# Patient Record
Sex: MALE | Race: WHITE | Employment: FULL TIME | URBAN - METROPOLITAN AREA
[De-identification: names, ages, dates, MRNs, and addresses within clinical notes are randomized per-mention and may not be internally consistent; named-entity substitution may affect disease eponyms.]

---

## 2019-10-08 DIAGNOSIS — N46.01 AZOOSPERMIA: ICD-10-CM

## 2019-10-08 DIAGNOSIS — Z30.2 STERILIZATION: Primary | ICD-10-CM

## 2019-10-09 DIAGNOSIS — Z30.2 STERILIZATION: Primary | ICD-10-CM

## 2019-10-09 DIAGNOSIS — N46.01 AZOOSPERMIA: ICD-10-CM

## 2019-10-09 LAB
ABSTINENCE DAYS: 3 DAYS (ref 2–7)
AGGLUTINATION: NO YES/NO
ANALYSIS TEMP - CENTIGRADE: 22 CENTIGRADE
CELL FRAGMENTS: ABNORMAL %
COLLECTION METHOD: ABNORMAL
COLLECTION SITE: ABNORMAL
CONSENT TO RELEASE TO PARTNER: YES
IMMATURE SPERM: ABNORMAL %
IMMOTILE: 0 %
LAB RECEIPT TIME: ABNORMAL
LIQUEFIED: YES YES/NO
NON-PROGRESSIVE MOTILITY: 0 %
PROGRESSIVE MOTILITY: 0 % (ref 32–?)
ROUND CELLS: 0 MILLION/ML (ref ?–2)
SPECIMEN CONCENTRATION: 0 MILLION/ML (ref 15–?)
SPECIMEN PH: 7.6 PH (ref 7.2–?)
SPECIMEN TYPE: ABNORMAL
SPECIMEN VOL UR: 2.5 ML (ref 1.5–?)
TIME OF ANALYSIS: ABNORMAL
TOTAL NUMBER: 0 MILLION (ref 39–?)
TOTAL PROGRESSIVE MOTILE: 0 MILLION (ref 15.6–?)
VISCOUS: NO YES/NO
WBC SPECIMEN: ABNORMAL %

## 2019-10-09 PROCEDURE — 89260 SPERM ISOLATION SIMPLE: CPT

## 2020-01-17 NOTE — TELEPHONE ENCOUNTER
MEDICAL RECORDS REQUEST   [facility]  Urology Clinic  [address] [place] [address]  PHONE: [phone]  Fax: [phone]        FUTURE VISIT INFORMATION                                                   Jamel Bacon : 1980 scheduled for future visit at [facility]    APPOINTMENT INFORMATION:    Date: 20 9:40AM     Provider:  Davi Mendez MD    Reason for Visit/Diagnosis: INFERTILITY     REFERRAL INFORMATION:    Referring provider: Self    Specialty: N/A    Clinic contact number:  N/A    RECORDS REQUESTED FOR VISIT                                                     NOTES  STATUS/DETAILS   OFFICE NOTE from referring provider  no   OFFICE NOTE from other specialist  no   DISCHARGE SUMMARY from hospital  no   DISCHARGE REPORT from the ER  no   OPERATIVE REPORT  no   MEDICATION LIST  no   INFERTILITY     ALBUMIN  no   FSH  no   LAST UROLOGY/OB GYN VISIT NOTE  no   LH  no   SEMEN ANALYSIS (LAST 2)  yes   SHBG  no   T  no     PRE-VISIT CHECKLIST      Record collection complete Yes- SA in epic   Appointment appropriately scheduled           (right time/right provider) Yes   MyChart activation If no, please explain: In process    Questionnaire complete If no, please explain: In process      Completed by: Shabnam Means

## 2020-02-04 ENCOUNTER — PRE VISIT (OUTPATIENT)
Dept: UROLOGY | Facility: CLINIC | Age: 40
End: 2020-02-04

## 2020-02-04 NOTE — TELEPHONE ENCOUNTER
Reason for Visit: Consult to discuss fertility    Orders/Procedures/Records: SA in system     Contact Patient: n/a    Rooming Requirements: normal      Roselyn Roach LPN  02/04/20  3:19 PM

## 2020-02-24 ASSESSMENT — ENCOUNTER SYMPTOMS
PANIC: 0
DYSURIA: 0
SWOLLEN GLANDS: 0
DEPRESSION: 0
HEMATURIA: 0
DIFFICULTY URINATING: 0
NERVOUS/ANXIOUS: 1
INSOMNIA: 1
DECREASED CONCENTRATION: 1
BRUISES/BLEEDS EASILY: 0
FLANK PAIN: 0

## 2020-02-28 ENCOUNTER — OFFICE VISIT (OUTPATIENT)
Dept: UROLOGY | Facility: CLINIC | Age: 40
End: 2020-02-28
Payer: COMMERCIAL

## 2020-02-28 ENCOUNTER — PRE VISIT (OUTPATIENT)
Dept: UROLOGY | Facility: CLINIC | Age: 40
End: 2020-02-28

## 2020-02-28 VITALS
HEIGHT: 67 IN | BODY MASS INDEX: 31.08 KG/M2 | WEIGHT: 198 LBS | SYSTOLIC BLOOD PRESSURE: 139 MMHG | DIASTOLIC BLOOD PRESSURE: 75 MMHG | HEART RATE: 58 BPM

## 2020-02-28 DIAGNOSIS — N46.01 AZOOSPERMIA: Primary | ICD-10-CM

## 2020-02-28 ASSESSMENT — PAIN SCALES - GENERAL: PAINLEVEL: NO PAIN (0)

## 2020-02-28 ASSESSMENT — MIFFLIN-ST. JEOR: SCORE: 1781.75

## 2020-02-28 NOTE — PROGRESS NOTES
It was my pleasure to see Mr. Jamel Bacon, a 39 year old male here in consultation today for fertility evaluation.  His spouse is Radha Bacon age 31 (1/9/89).    This couple has been attempting to conceive for the last 1.5 yrs.  Pregnancies with other partners: he has 3 previous children, Radha has no pregnancy with other partners.  They have tried timed intercourse. They have not tried IUI or IVF.    Female factors suspected: None known.  Cycles are regular. Normal pelvic US.    Male factor-   -history of vasectomy 2012, 3 children ( twins) before the vasectomy.  -history of vasovasostomy 8/2018 Dr. Mckeon (EZ vasectomy).  They got pregnant around Oct or Nov 2018, and no further pregnancies after that.  - he had 2 semen analyses that showed azoospermia, about 10/2019 and 12/2019.    PAST MEDICAL HISTORY:    No chronic medical problems     PAST SURG HISTORY  Vasectomy 2012  Vasovasostomy 2018    Medications as of 2/28/2020:  No prescription medications     ALLERGY:   No Known Allergies    SOCIAL HISTORY:  . Occupation: law firm, .  No alcohol abuse, no tobacco use.   Social History     Tobacco Use     Smoking status: Former Smoker     Smokeless tobacco: Never Used   Substance Use Topics     Alcohol use: None     Drug use: None       FAMILY HISTORY: No inherited disorders.      REVIEW OF SYSTEMS:  Significant for noting sometimes less libido and erection duration.    Denies erectile dysfunction, ejaculatory problems, testicular pain. No vision or smell deficits, no chronic sinus or respiratory infections. No recent febrile illness, weight loss. No heat or cold intolerance, gynecomastia, or other endocrine complaints.    Otherwise, no constitutional, eye, ENT, heart, lung, GI , musculoskeletal, skin, neurologic, psychiatric, or hematologic complaints.    GONADOTOXIN EXPOSURE: They do have a hot tub- uses 2x/week. Otherwise negative for marijuana, heat, chemicals, pesticides, heavy metals, steroids,  "chemotherapy or radiation.    GENERAL PHYSICAL EXAM  /75   Pulse 58   Ht 1.702 m (5' 7\")   Wt 89.8 kg (198 lb)   BMI 31.01 kg/m     Constitutional: No acute distress. Well nourished.   PSYCH: normal mood and affect.  NEURO: normal gait, no focal deficits.   EYES: anicteric, EOMI, PERR  CARDIOPULMONARY: breathing non-labored, pulse regular, no peripheral edema.  GI: Abdomen soft, non-tender, no surgical scars, no organomegaly.  MUSCULOSKELETAL: normal limb proportions, no muscle wasting, no contractures.  SKIN: Normal virilized hair distribution, no lesions, warts or rashes over genitalia, abdomen extremities or face.  HEME/LYMPH: no ecchymosis, no lymphadenopathy in groin, no lymphedema.     EXAM:  Phallus uncircumcised, meatus adequate, no plaques palpated.   Left testis descended , size is 22 cc , consistency is normal . No intra-testicular masses.   Right testis descended , size is 22 cc , consistency is normal . No intra-testicular masses.   Epididymes present, non-tender, both are indurated c/w post-vasectomy   Left cord: Vas present. Grade II  varicocele noted.  Right cord: Vas present. No varicocele noted.     Rectal exam deferred.     Component      Latest Ref Rng & Units 10/9/2019   Collection Method       Masturbation   Collection Site       JAMES   Specimen Type       Semen   Lab Receipt Time       11:10 AM   Time of Analysis       11:40 AM   Analysis Temp - Centigrade      centigrade 22   Abstinence days      2 - 7 days 3   Liquefied      yes/no Yes   Viscous      yes/no No   Agglutination      yes/no No   pH      7.2 pH 7.6   Volume      1.5 ml 2.5   Concentration      15 million/ml 0 (A)   Total Number      39 million 0 (A)   Progressive motility      32 % 0 (A)   Non-progressive motility      % 0   Immotile      % 0   Total Progressive Motile      15.6 million 0 (A)   Round Cells      2 million/ml 0   WBC      % .   Immature Sperm      % .   Cell Fragments      % .   Consent to Release to " Partner       Yes     Outside facility labs 2/26/20   Testosterone 582 (264-916)  LH 5.0 ( 1.7-8.6)  FSH 5.2 (1.5-12)  E2 31.7 (7.6-42)    ASSESSMENT:    Fertility Testing.    Testicular hypofunction- obstructive azoospermia.    Grade II left  varicocele noted    PLAN:    Hormonal panel reviewed as above.    Semen analysis reviewed.  Azoospermia after vasovasostomy  Consistent with obstructive azoospermia.    Advised avoid excess heat exposure.    Discussed that IVF or repeat vasovasostomy would be options for fertility.    Discussed risks, benefits, and alternatives of vasovasostomy/ vasoepididymostomy surgery.    They will think about options and let us know how they want to proceed.        Davi Mendez MD

## 2020-02-28 NOTE — LETTER
2/28/2020       RE: Jamel Bacon  [address]  [place] [address]     Dear Colleague,    Thank you for referring your patient, Jamel Bacon, to the [facility] UR[facility] AND [facility] FOR PROSTATE AND UROLOGIC CANCERS at [facility]. Please see a copy of my visit note below.    It was my pleasure to see . Jamel Bacon, a 39 year old male here in consultation today for fertility evaluation.  His spouse is Radha Bacon age 31 (1/9/89).    This couple has been attempting to conceive for the last 1.5 yrs.  Pregnancies with other partners: he has 3 previous children, Radha has no pregnancy with other partners.  They have tried timed intercourse. They have not tried IUI or IVF.    Female factors suspected: None known.  Cycles are regular. Normal pelvic US.    Male factor-   -history of vasectomy 2012, 3 children ( twins) before the vasectomy.  -history of vasovasostomy 8/2018 Dr. Mckeon (EZ vasectomy).  They got pregnant around Oct or Nov 2018, and no further pregnancies after that.  - he had 2 semen analyses that showed azoospermia, about 10/2019 and 12/2019.    PAST MEDICAL HISTORY:    No chronic medical problems     PAST SURG HISTORY  Vasectomy 2012  Vasovasostomy 2018    Medications as of 2/28/2020:  No prescription medications     ALLERGY:   No Known Allergies    SOCIAL HISTORY:  . Occupation: law firm, .  No alcohol abuse, no tobacco use.   Social History     Tobacco Use     Smoking status: Former Smoker     Smokeless tobacco: Never Used   Substance Use Topics     Alcohol use: None     Drug use: None       FAMILY HISTORY: No inherited disorders.      REVIEW OF SYSTEMS:  Significant for noting sometimes less libido and erection duration.    Denies erectile dysfunction, ejaculatory problems, testicular pain. No vision or smell deficits, no chronic sinus or respiratory infections. No recent febrile illness, weight loss. No heat or cold intolerance, gynecomastia, or other  "endocrine complaints.    Otherwise, no constitutional, eye, ENT, heart, lung, GI , musculoskeletal, skin, neurologic, psychiatric, or hematologic complaints.    GONADOTOXIN EXPOSURE: They do have a hot tub- uses 2x/week. Otherwise negative for marijuana, heat, chemicals, pesticides, heavy metals, steroids, chemotherapy or radiation.    GENERAL PHYSICAL EXAM  /75   Pulse 58   Ht 1.702 m (5' 7\")   Wt 89.8 kg (198 lb)   BMI 31.01 kg/m      Constitutional: No acute distress. Well nourished.   PSYCH: normal mood and affect.  NEURO: normal gait, no focal deficits.   EYES: anicteric, EOMI, PERR  CARDIOPULMONARY: breathing non-labored, pulse regular, no peripheral edema.  GI: Abdomen soft, non-tender, no surgical scars, no organomegaly.  MUSCULOSKELETAL: normal limb proportions, no muscle wasting, no contractures.  SKIN: Normal virilized hair distribution, no lesions, warts or rashes over genitalia, abdomen extremities or face.  HEME/LYMPH: no ecchymosis, no lymphadenopathy in groin, no lymphedema.     EXAM:  Phallus uncircumcised, meatus adequate, no plaques palpated.   Left testis descended , size is 22 cc , consistency is normal . No intra-testicular masses.   Right testis descended , size is 22 cc , consistency is normal . No intra-testicular masses.   Epididymes present, non-tender, both are indurated c/w post-vasectomy   Left cord: Vas present. Grade II  varicocele noted.  Right cord: Vas present. No varicocele noted.     Rectal exam deferred.     Component      Latest Ref Rng & Units 10/9/2019   Collection Method       Masturbation   Collection Site       JAMES   Specimen Type       Semen   Lab Receipt Time       11:10 AM   Time of Analysis       11:40 AM   Analysis Temp - Centigrade      centigrade 22   Abstinence days      2 - 7 days 3   Liquefied      yes/no Yes   Viscous      yes/no No   Agglutination      yes/no No   pH      7.2 pH 7.6   Volume      1.5 ml 2.5   Concentration      15 million/ml 0 (A) "   Total Number      39 million 0 (A)   Progressive motility      32 % 0 (A)   Non-progressive motility      % 0   Immotile      % 0   Total Progressive Motile      15.6 million 0 (A)   Round Cells      2 million/ml 0   WBC      % .   Immature Sperm      % .   Cell Fragments      % .   Consent to Release to Partner       Yes     Outside facility labs 2/26/20   Testosterone 582 (264-916)  LH 5.0 ( 1.7-8.6)  FSH 5.2 (1.5-12)  E2 31.7 (7.6-42)    ASSESSMENT:    Fertility Testing.    Testicular hypofunction- obstructive azoospermia.    Grade II left  varicocele noted    PLAN:    Hormonal panel reviewed as above.    Semen analysis reviewed.  Azoospermia after vasovasostomy  Consistent with obstructive azoospermia.    Advised avoid excess heat exposure.    Discussed that IVF or repeat vasovasostomy would be options for fertility.    Discussed risks, benefits, and alternatives of vasovasostomy/ vasoepididymostomy surgery.    They will think about options and let us know how they want to proceed.        Davi Mendez MD        Again, thank you for allowing me to participate in the care of your patient.      Sincerely,    Davi Mendez MD

## 2020-03-03 ENCOUNTER — TELEPHONE (OUTPATIENT)
Dept: UROLOGY | Facility: CLINIC | Age: 40
End: 2020-03-03

## 2020-03-03 DIAGNOSIS — Z98.52 S/P VASECTOMY: Primary | ICD-10-CM

## 2020-03-03 RX ORDER — CEFAZOLIN SODIUM 1 G/50ML
1 INJECTION, SOLUTION INTRAVENOUS SEE ADMIN INSTRUCTIONS
Status: CANCELLED | OUTPATIENT
Start: 2020-03-03

## 2020-03-03 RX ORDER — CEFAZOLIN SODIUM 2 G/50ML
2 SOLUTION INTRAVENOUS
Status: CANCELLED | OUTPATIENT
Start: 2020-03-03

## 2020-03-03 NOTE — TELEPHONE ENCOUNTER
Patient is scheduled for surgery with Dr. Mendez      Spoke or left message with: Radha    Date of Surgery: 6/9/20    Location: [facility] OR    Informed patient they will need an adult  yes    Pre-op with surgeon (if applicable): n/a    H&P: Scheduled with PAC 5/26/20    Additional imaging/appointments: n/a    Surgery packet: mailed 3/4/20     Additional comments: n/a

## 2020-03-05 NOTE — TELEPHONE ENCOUNTER
FUTURE VISIT INFORMATION      SURGERY INFORMATION:    Date: 6/9/20    Location: [facility] OR    Surgeon:  Davi Mendez MD    Anesthesia Type:  General    Procedure: VASOVASOSTOMY    RECORDS REQUESTED FROM:       Primary Care Provider: None

## 2020-03-11 ENCOUNTER — HEALTH MAINTENANCE LETTER (OUTPATIENT)
Age: 40
End: 2020-03-11

## 2020-05-22 DIAGNOSIS — Z11.59 ENCOUNTER FOR SCREENING FOR OTHER VIRAL DISEASES: Primary | ICD-10-CM

## 2020-05-26 ENCOUNTER — OFFICE VISIT (OUTPATIENT)
Dept: SURGERY | Facility: CLINIC | Age: 40
End: 2020-05-26
Payer: COMMERCIAL

## 2020-05-26 ENCOUNTER — ANESTHESIA EVENT (OUTPATIENT)
Dept: SURGERY | Facility: AMBULATORY SURGERY CENTER | Age: 40
End: 2020-05-26

## 2020-05-26 ENCOUNTER — PRE VISIT (OUTPATIENT)
Dept: SURGERY | Facility: CLINIC | Age: 40
End: 2020-05-26

## 2020-05-26 VITALS
WEIGHT: 208.8 LBS | TEMPERATURE: 98.2 F | HEIGHT: 67 IN | BODY MASS INDEX: 32.77 KG/M2 | DIASTOLIC BLOOD PRESSURE: 79 MMHG | SYSTOLIC BLOOD PRESSURE: 126 MMHG | OXYGEN SATURATION: 97 % | HEART RATE: 56 BPM | RESPIRATION RATE: 16 BRPM

## 2020-05-26 DIAGNOSIS — Z01.818 PRE-OP EVALUATION: Primary | ICD-10-CM

## 2020-05-26 DIAGNOSIS — Z98.52 S/P VASECTOMY: ICD-10-CM

## 2020-05-26 RX ORDER — MIRTAZAPINE 15 MG/1
1 TABLET, FILM COATED ORAL
COMMUNITY
Start: 2020-05-07

## 2020-05-26 ASSESSMENT — MIFFLIN-ST. JEOR: SCORE: 1825.74

## 2020-05-26 ASSESSMENT — LIFESTYLE VARIABLES: TOBACCO_USE: 1

## 2020-05-26 NOTE — H&P
Pre-Operative H & P     CC:  Preoperative exam to assess for increased cardiopulmonary risk while undergoing surgery and anesthesia.    Date of Encounter: 5/26/2020  Primary Care Physician:  No Ref-Primary, Physician  Associated diagnosis: s/p vasectomy     HPI  Jamel Bacon is a 40 year old male who presents for pre-operative H & P in preparation for VASOVASOSTOMY with Dr. Mendez on 6/9/20 at [facility] [place]. Patient is being evaluated without significant comorbid conditions    Mr. aBcon and his wife have been attempting to conceive for 1.5 years. He has 3 previous children with another partner.  He is s/p vasectomy in 2012 and vasovasectomy 2018. Semen analysis showed azoospermia x2. He was seen by urology and discussed the above procedure.    History is obtained from the patient and chart review.    Past Medical History  No past medical history on file.    Past Surgical History  No past surgical history on file.    Hx of Blood transfusions/reactions: *denies    Hx of abnormal bleeding or anti-platelet use: deneis    Steroid use in the last year: 1 burst, no chronic    Personal or FH with difficulty with Anesthesia:  Denies FH of complications,  Patient has never had anesthesia other than local    Prior to Admission Medications  Current Outpatient Medications   Medication Sig Dispense Refill     mirtazapine (REMERON) 15 MG tablet Take 1 tablet by mouth nightly as needed         Allergies  No Known Allergies    Social History  Social History     Socioeconomic History     Marital status:      Spouse name: Not on file     Number of children: Not on file     Years of education: Not on file     Highest education level: Not on file   Occupational History     Not on file   Social Needs     Financial resource strain: Not on file     Food insecurity     Worry: Not on file     Inability: Not on file     Transportation needs     Medical: Not on file     Non-medical: Not on file   Tobacco Use      "Smoking status: Former Smoker     Last attempt to quit: 2010     Years since quitting: 10.4     Smokeless tobacco: Never Used   Substance and Sexual Activity     Alcohol use: Not on file     Comment: rare     Drug use: Not Currently     Sexual activity: Not on file   Lifestyle     Physical activity     Days per week: Not on file     Minutes per session: Not on file     Stress: Not on file   Relationships     Social connections     Talks on phone: Not on file     Gets together: Not on file     Attends [employer] service: Not on file     Active member of club or organization: Not on file     Attends meetings of clubs or organizations: Not on file     Relationship status: Not on file     Intimate partner violence     Fear of current or ex partner: Not on file     Emotionally abused: Not on file     Physically abused: Not on file     Forced sexual activity: Not on file   Other Topics Concern     Not on file   Social History Narrative     Not on file       Family History  Family History   Problem Relation Age of Onset     Anesthesia Reaction No family hx of      Deep Vein Thrombosis (DVT) No family hx of          ROS/MED HX    ENT/Pulmonary:     (+)tobacco use, Past use , . .    Neurologic:  - neg neurologic ROS     Cardiovascular:  - neg cardiovascular ROS      (-) taking anticoagulants/antiplatelets   METS/Exercise Tolerance:     Hematologic:  - neg hematologic  ROS      (-) History of Transfusion   Musculoskeletal:  - neg musculoskeletal ROS       GI/Hepatic:  - neg GI/hepatic ROS       Renal/Genitourinary:  - ROS Renal section negative       Endo:  - neg endo ROS       Psychiatric:  - neg psychiatric ROS       Infectious Disease:  - neg infectious disease ROS       Malignancy:      - no malignancy   Other:             The complete review of systems is negative other than noted in the HPI or here.   Temp: 98.2  F (36.8  C) Temp src: Oral BP: 126/79 Pulse: 56   Resp: 16 SpO2: 97 %         208 lbs 12.8 oz  5' 7\"   Body " mass index is 32.7 kg/m .       Physical Exam  Constitutional: Awake, alert, cooperative, no apparent distress, and appears stated age.  Eyes: Pupils equal, round and reactive to light, extra ocular muscles intact, sclera clear, conjunctiva normal.  HENT: Normocephalic, oral pharynx with moist mucus membranes, good dentition. No goiter appreciated.   Respiratory: Clear to auscultation bilaterally, no crackles or wheezing.  Cardiovascular: Regular rate and rhythm, normal S1 and S2, and no murmur noted.  Carotids no bruits. No edema. Palpable pulses to radial arteries.   GI: Normal bowel sounds, soft, non-distended, non tender   Lymph/Hematologic: No cervical lymphadenopathy and no supraclavicular lymphadenopathy.  Genitourinary:  deferred  Skin: Warm and dry.  Musculoskeletal: Full ROM of neck. There is no redness, warmth, or swelling of the exposed joints. Gross motor strength is normal.    Neurologic: Awake, alert, oriented to name, place and time. Cranial nerves II-XII are grossly intact. Gait is normal.   Neuropsychiatric: Calm, cooperative. Normal affect.       Outside records reviewed from: care everywhere    ASSESSMENT and PLAN  Jamel Bacon is a 40 year old male scheduled for VASOVASOSTOMY on 6/9/20 by Dr. Mendez in treatment of s/p vasectomy.  PAC referral for risk assessment and optimization for anesthesia without significant comorbid conditions:    Pre-operative considerations:  1.  Cardiac:  Functional status- METS >4. No reported cardiac history.  denies cardiac symptoms. low risk surgery with 0.4% (RCRI #) risk of major adverse cardiac event.   2.  Pulm:  Airway feasible.  SUNITA risk low. denies pulmonary symptoms. COVID test ordered per surgical team.   3.  GI:  Risk of PONV score = 1.  If > 2, anti-emetic intervention recommended.  4.  S/p vasectomy with above procedure planned.     VTE risk: 0.5%    Patient is optimized and is acceptable candidate for the proposed procedure.  No further diagnostic  evaluation is needed.     Amna Ovalle PA-C  Preoperative Assessment Center  [facility] and [facility]  Phone: [phone]  Fax: [phone]

## 2020-05-26 NOTE — PATIENT INSTRUCTIONS
Preparing for Your Surgery      Name:  Jamel Bacon   MRN:  [id number]   :  1980   Today's Date:  2020     Arriving for surgery:  Surgery date:  2020  Arrival time:  6:30 am  Due to the COVID 19 crisis, we are trying to keep our patients safe from others who might have respiratory illnesses so the hospital is implementing a no visitor policy.    Please come to:    [facility]  [address] [place] [id number]     Parking has been suspended due to the Covid 19 Crisis    Please arrive at the [facility] Entrance  And follow instructions for entering the building  You will be escorted to the 5th floor by a staff member     What can I eat or drink?  -  You may have solid food or milk products until 8 hours prior to your surgery.(Midnight )  -  You may have water, apple juice or 7up/Sprite until 4  hours prior to your surgery. (4 am)    Which medicines can I take?          Hold Aspirin, vitamins and supplements one week prior to surgery.        Hold Ibuprofen for 24 hours and/or Naproxen for 48 hours prior to surgery.    -  Please take these medications the day of surgery:  NONE    How do I prepare myself?  -  Take two showers: one the night before surgery; and one the morning of surgery.         Use Scrubcare or Hibiclens to wash from neck down, leave soap on your skin for up to one minute.        Do not get soap in your eyes or ears.  You may use your own shampoo and conditioner; no other hair products.   -  Do NOT use lotion, powder, deodorant, or antiperspirant the day of your surgery.  -  Do NOT wear any makeup, fingernail polish or jewelry.  - Do not bring your own medications to the hospital, except for inhalers and eye   drops.  -  Bring your ID and insurance card.      -If you are scheduled to go home the Same Day as surgery you must have a responsible adult as a  and to stay with you overnight the first 24 hours after surgery.      Questions or Concerns:    Please contact the Ambulatory Surgery Center at  [phone].    -If you have health changes between today and your surgery please call your surgeon.     For questions after surgery please call your surgeons office.

## 2020-05-26 NOTE — ANESTHESIA PREPROCEDURE EVALUATION
"Anesthesia Pre-Procedure Evaluation    Patient: Jamel Bacon   MRN:     [id number] Gender:   male   Age:    40 year old :      1980        Preoperative Diagnosis: S/P vasectomy [Z98.52]   Procedure(s):  VASOVASOSTOMY     LABS:  CBC: No results found for: WBC, HGB, HCT, PLT  BMP: No results found for: NA, POTASSIUM, CHLORIDE, CO2, BUN, CR, GLC  COAGS: No results found for: PTT, INR, FIBR  POC: No results found for: BGM, HCG, HCGS  OTHER: No results found for: PH, LACT, A1C, MIKO, PHOS, MAG, ALBUMIN, PROTTOTAL, ALT, AST, GGT, ALKPHOS, BILITOTAL, BILIDIRECT, LIPASE, AMYLASE, CAROLA, TSH, T4, T3, CRP, SED     Preop Vitals    BP Readings from Last 3 Encounters:   20 139/75    Pulse Readings from Last 3 Encounters:   20 58      Resp Readings from Last 3 Encounters:   No data found for Resp    SpO2 Readings from Last 3 Encounters:   No data found for SpO2      Temp Readings from Last 1 Encounters:   No data found for Temp    Ht Readings from Last 1 Encounters:   20 1.702 m (5' 7\")      Wt Readings from Last 1 Encounters:   20 89.8 kg (198 lb)    Estimated body mass index is 31.01 kg/m  as calculated from the following:    Height as of 20: 1.702 m (5' 7\").    Weight as of 20: 89.8 kg (198 lb).     LDA:        No past medical history on file.   No past surgical history on file.   No Known Allergies     Anesthesia Evaluation     .  Type of anesthetic: local only in the past.           ROS/MED HX    ENT/Pulmonary:     (+)tobacco use, Past use , . .    Neurologic:  - neg neurologic ROS     Cardiovascular:  - neg cardiovascular ROS   (+) ----. : . . . :. . No previous cardiac testing      (-) taking anticoagulants/antiplatelets   METS/Exercise Tolerance: Comment: Cardiovascular exercise 3-4 times weekly, lifts weights 4-5 times weekly >4 METS   Hematologic:  - neg hematologic  ROS      (-) History of Transfusion   Musculoskeletal:  - neg musculoskeletal ROS       GI/Hepatic:  - neg " GI/hepatic ROS       Renal/Genitourinary:  - ROS Renal section negative       Endo:  - neg endo ROS       Psychiatric:  - neg psychiatric ROS       Infectious Disease:  - neg infectious disease ROS       Malignancy:      - no malignancy   Other:                         PHYSICAL EXAM:   Mental Status/Neuro: A/A/O   Airway: Facies: Feasible  Mallampati: II  Mouth/Opening: Full  TM distance: > 6 cm  Neck ROM: Full   Respiratory: Auscultation: CTAB     Resp. Rate: Normal     Resp. Effort: Normal      CV: Rhythm: Regular  Heart: Normal Sounds  Edema: None   Comments:      Dental: Normal Dentition                Assessment:   ASA SCORE: 2    H&P: History and physical reviewed and following examination; no interval change.   Smoking Status:  Non-Smoker/Unknown   NPO Status: NPO Appropriate     Plan:   Anes. Type:  General   Pre-Medication: None   Induction:  IV (Standard)   Airway: LMA   Access/Monitoring: PIV   Maintenance: TIVA     Postop Plan:   Postop Pain: Opioids  Postop Sedation/Airway: Not planned  Disposition: Outpatient     PONV Management:   Adult Risk Factors:, Non-Smoker, Postop Opioids   Prevention: Ondansetron, Dexamethasone, No Volatiles     CONSENT: Direct conversation   Plan and risks discussed with: Patient                   PAC Discussion and Assessment    ASA Classification: 2  Case is suitable for: ASC  Anesthetic techniques and relevant risks discussed: GA  Invasive monitoring and risk discussed:   Types:   Possibility and Risk of blood transfusion discussed:   NPO instructions given:   Additional anesthetic preparation and risks discussed:   Needs early admission to pre-op area:   Other:     PAC Resident/NP Anesthesia Assessment:  Jamel Bacon is a 40 year old male scheduled for VASOVASOSTOMY on 6/9/20 by Dr. Mendez in treatment of s/p vasectomy.  PAC referral for risk assessment and optimization for anesthesia without significant comorbid conditions:    Pre-operative considerations:  1.  Cardiac:   Functional status- METS >4. No reported cardiac history.  denies cardiac symptoms. low risk surgery with 0.4% (RCRI #) risk of major adverse cardiac event.   2.  Pulm:  Airway feasible.  SUNITA risk low. denies pulmonary symptoms. COVID test ordered per surgical team.   3.  GI:  Risk of PONV score = 1.  If > 2, anti-emetic intervention recommended.  4.  S/p vasectomy with above procedure planned.     VTE risk: 0.5%    Patient is optimized and is acceptable candidate for the proposed procedure.  No further diagnostic evaluation is needed.     **For further details of assessment, testing, and physical exam please see H and P completed on same date.      Amna Ovalle PA-C        Mid-Level Provider/Resident:   Date:   Time:     Attending Anesthesiologist Anesthesia Assessment:        Anesthesiologist:   Date:   Time:   Pass/Fail:   Disposition:     PAC Pharmacist Assessment:        Pharmacist:   Date:   Time:    Amna Ovalle PA-C

## 2020-06-01 ENCOUNTER — PATIENT OUTREACH (OUTPATIENT)
Dept: UROLOGY | Facility: CLINIC | Age: 40
End: 2020-06-01

## 2020-06-01 NOTE — TELEPHONE ENCOUNTER
Patient will pay the funds in the am   Saira will call the wife to collect the funds.  Patient is ready for his procedure.  Tiffanie Collins RN   Care Coordinator Urology

## 2020-06-03 DIAGNOSIS — Z11.59 ENCOUNTER FOR SCREENING FOR OTHER VIRAL DISEASES: Primary | ICD-10-CM

## 2020-06-06 DIAGNOSIS — Z11.59 ENCOUNTER FOR SCREENING FOR OTHER VIRAL DISEASES: ICD-10-CM

## 2020-06-06 PROCEDURE — 99000 SPECIMEN HANDLING OFFICE-LAB: CPT | Performed by: UROLOGY

## 2020-06-07 LAB
SARS-COV-2 RNA SPEC QL NAA+PROBE: NOT DETECTED
SPECIMEN SOURCE: NORMAL

## 2020-06-09 ENCOUNTER — ANESTHESIA (OUTPATIENT)
Dept: SURGERY | Facility: AMBULATORY SURGERY CENTER | Age: 40
End: 2020-06-09

## 2020-06-09 ENCOUNTER — HOSPITAL ENCOUNTER (OUTPATIENT)
Facility: AMBULATORY SURGERY CENTER | Age: 40
End: 2020-06-09
Attending: UROLOGY
Payer: COMMERCIAL

## 2020-06-09 VITALS
HEART RATE: 70 BPM | RESPIRATION RATE: 16 BRPM | HEIGHT: 67 IN | OXYGEN SATURATION: 97 % | SYSTOLIC BLOOD PRESSURE: 112 MMHG | BODY MASS INDEX: 31.08 KG/M2 | WEIGHT: 198 LBS | TEMPERATURE: 98.4 F | DIASTOLIC BLOOD PRESSURE: 63 MMHG

## 2020-06-09 DIAGNOSIS — Z98.52 S/P VASECTOMY: ICD-10-CM

## 2020-06-09 RX ORDER — ACETAMINOPHEN 325 MG/1
975 TABLET ORAL ONCE
Status: COMPLETED | OUTPATIENT
Start: 2020-06-09 | End: 2020-06-09

## 2020-06-09 RX ORDER — FENTANYL CITRATE 50 UG/ML
25-50 INJECTION, SOLUTION INTRAMUSCULAR; INTRAVENOUS EVERY 5 MIN PRN
Status: DISCONTINUED | OUTPATIENT
Start: 2020-06-09 | End: 2020-06-10 | Stop reason: HOSPADM

## 2020-06-09 RX ORDER — SODIUM CHLORIDE, SODIUM LACTATE, POTASSIUM CHLORIDE, CALCIUM CHLORIDE 600; 310; 30; 20 MG/100ML; MG/100ML; MG/100ML; MG/100ML
INJECTION, SOLUTION INTRAVENOUS CONTINUOUS
Status: DISCONTINUED | OUTPATIENT
Start: 2020-06-09 | End: 2020-06-10 | Stop reason: HOSPADM

## 2020-06-09 RX ORDER — CEFAZOLIN SODIUM 1 G/50ML
1 SOLUTION INTRAVENOUS SEE ADMIN INSTRUCTIONS
Status: DISCONTINUED | OUTPATIENT
Start: 2020-06-09 | End: 2020-06-10 | Stop reason: HOSPADM

## 2020-06-09 RX ORDER — AMOXICILLIN 250 MG
1-2 CAPSULE ORAL 2 TIMES DAILY
Qty: 30 TABLET | Refills: 0 | Status: SHIPPED | OUTPATIENT
Start: 2020-06-09

## 2020-06-09 RX ORDER — BUPIVACAINE HYDROCHLORIDE 5 MG/ML
INJECTION, SOLUTION PERINEURAL PRN
Status: DISCONTINUED | OUTPATIENT
Start: 2020-06-09 | End: 2020-06-09 | Stop reason: HOSPADM

## 2020-06-09 RX ORDER — ONDANSETRON 2 MG/ML
INJECTION INTRAMUSCULAR; INTRAVENOUS PRN
Status: DISCONTINUED | OUTPATIENT
Start: 2020-06-09 | End: 2020-06-09

## 2020-06-09 RX ORDER — NALOXONE HYDROCHLORIDE 0.4 MG/ML
.1-.4 INJECTION, SOLUTION INTRAMUSCULAR; INTRAVENOUS; SUBCUTANEOUS
Status: DISCONTINUED | OUTPATIENT
Start: 2020-06-09 | End: 2020-06-10 | Stop reason: HOSPADM

## 2020-06-09 RX ORDER — ACETAMINOPHEN 325 MG/1
650 TABLET ORAL ONCE
Status: CANCELLED | OUTPATIENT
Start: 2020-06-09 | End: 2020-06-09

## 2020-06-09 RX ORDER — ONDANSETRON 4 MG/1
4 TABLET, ORALLY DISINTEGRATING ORAL EVERY 30 MIN PRN
Status: DISCONTINUED | OUTPATIENT
Start: 2020-06-09 | End: 2020-06-10 | Stop reason: HOSPADM

## 2020-06-09 RX ORDER — OXYCODONE HYDROCHLORIDE 5 MG/1
5 TABLET ORAL EVERY 4 HOURS PRN
Status: DISCONTINUED | OUTPATIENT
Start: 2020-06-09 | End: 2020-06-10 | Stop reason: HOSPADM

## 2020-06-09 RX ORDER — LIDOCAINE 40 MG/G
CREAM TOPICAL
Status: DISCONTINUED | OUTPATIENT
Start: 2020-06-09 | End: 2020-06-10 | Stop reason: HOSPADM

## 2020-06-09 RX ORDER — GABAPENTIN 300 MG/1
300 CAPSULE ORAL ONCE
Status: COMPLETED | OUTPATIENT
Start: 2020-06-09 | End: 2020-06-09

## 2020-06-09 RX ORDER — ACETAMINOPHEN 325 MG/1
650 TABLET ORAL EVERY 4 HOURS PRN
Qty: 50 TABLET | Refills: 0 | Status: SHIPPED | OUTPATIENT
Start: 2020-06-09

## 2020-06-09 RX ORDER — ONDANSETRON 2 MG/ML
4 INJECTION INTRAMUSCULAR; INTRAVENOUS EVERY 30 MIN PRN
Status: DISCONTINUED | OUTPATIENT
Start: 2020-06-09 | End: 2020-06-10 | Stop reason: HOSPADM

## 2020-06-09 RX ORDER — PROPOFOL 10 MG/ML
INJECTION, EMULSION INTRAVENOUS CONTINUOUS PRN
Status: DISCONTINUED | OUTPATIENT
Start: 2020-06-09 | End: 2020-06-09

## 2020-06-09 RX ORDER — CEFAZOLIN SODIUM 2 G/50ML
2 SOLUTION INTRAVENOUS
Status: COMPLETED | OUTPATIENT
Start: 2020-06-09 | End: 2020-06-09

## 2020-06-09 RX ORDER — LIDOCAINE HYDROCHLORIDE 20 MG/ML
INJECTION, SOLUTION INFILTRATION; PERINEURAL PRN
Status: DISCONTINUED | OUTPATIENT
Start: 2020-06-09 | End: 2020-06-09

## 2020-06-09 RX ORDER — DEXAMETHASONE SODIUM PHOSPHATE 4 MG/ML
INJECTION, SOLUTION INTRA-ARTICULAR; INTRALESIONAL; INTRAMUSCULAR; INTRAVENOUS; SOFT TISSUE PRN
Status: DISCONTINUED | OUTPATIENT
Start: 2020-06-09 | End: 2020-06-09

## 2020-06-09 RX ORDER — PROPOFOL 10 MG/ML
INJECTION, EMULSION INTRAVENOUS PRN
Status: DISCONTINUED | OUTPATIENT
Start: 2020-06-09 | End: 2020-06-09

## 2020-06-09 RX ORDER — MEPERIDINE HYDROCHLORIDE 25 MG/ML
12.5 INJECTION INTRAMUSCULAR; INTRAVENOUS; SUBCUTANEOUS
Status: DISCONTINUED | OUTPATIENT
Start: 2020-06-09 | End: 2020-06-10 | Stop reason: HOSPADM

## 2020-06-09 RX ORDER — FENTANYL CITRATE 50 UG/ML
INJECTION, SOLUTION INTRAMUSCULAR; INTRAVENOUS PRN
Status: DISCONTINUED | OUTPATIENT
Start: 2020-06-09 | End: 2020-06-09

## 2020-06-09 RX ORDER — OXYCODONE HYDROCHLORIDE 5 MG/1
5 TABLET ORAL EVERY 6 HOURS PRN
Qty: 20 TABLET | Refills: 0 | Status: SHIPPED | OUTPATIENT
Start: 2020-06-09

## 2020-06-09 RX ADMIN — CEFAZOLIN SODIUM 1 G: 2 SOLUTION INTRAVENOUS at 12:11

## 2020-06-09 RX ADMIN — Medication 0.5 MG: at 10:10

## 2020-06-09 RX ADMIN — PROPOFOL: 10 INJECTION, EMULSION INTRAVENOUS at 10:56

## 2020-06-09 RX ADMIN — CEFAZOLIN SODIUM 2 G: 2 SOLUTION INTRAVENOUS at 08:13

## 2020-06-09 RX ADMIN — DEXAMETHASONE SODIUM PHOSPHATE 4 MG: 4 INJECTION, SOLUTION INTRA-ARTICULAR; INTRALESIONAL; INTRAMUSCULAR; INTRAVENOUS; SOFT TISSUE at 08:13

## 2020-06-09 RX ADMIN — ONDANSETRON 4 MG: 2 INJECTION INTRAMUSCULAR; INTRAVENOUS at 08:13

## 2020-06-09 RX ADMIN — PROPOFOL: 10 INJECTION, EMULSION INTRAVENOUS at 12:24

## 2020-06-09 RX ADMIN — PROPOFOL 150 MCG/KG/MIN: 10 INJECTION, EMULSION INTRAVENOUS at 08:03

## 2020-06-09 RX ADMIN — Medication 0.5 MG: at 11:46

## 2020-06-09 RX ADMIN — PROPOFOL: 10 INJECTION, EMULSION INTRAVENOUS at 11:41

## 2020-06-09 RX ADMIN — CEFAZOLIN SODIUM 1 G: 2 SOLUTION INTRAVENOUS at 10:11

## 2020-06-09 RX ADMIN — PROPOFOL: 10 INJECTION, EMULSION INTRAVENOUS at 10:11

## 2020-06-09 RX ADMIN — GABAPENTIN 300 MG: 300 CAPSULE ORAL at 07:14

## 2020-06-09 RX ADMIN — ACETAMINOPHEN 975 MG: 325 TABLET ORAL at 07:13

## 2020-06-09 RX ADMIN — PROPOFOL 100 MCG/KG/MIN: 10 INJECTION, EMULSION INTRAVENOUS at 13:08

## 2020-06-09 RX ADMIN — LIDOCAINE HYDROCHLORIDE 100 MG: 20 INJECTION, SOLUTION INFILTRATION; PERINEURAL at 08:05

## 2020-06-09 RX ADMIN — ONDANSETRON 4 MG: 2 INJECTION INTRAMUSCULAR; INTRAVENOUS at 13:15

## 2020-06-09 RX ADMIN — FENTANYL CITRATE 100 MCG: 50 INJECTION, SOLUTION INTRAMUSCULAR; INTRAVENOUS at 08:09

## 2020-06-09 RX ADMIN — PROPOFOL: 10 INJECTION, EMULSION INTRAVENOUS at 09:25

## 2020-06-09 RX ADMIN — SODIUM CHLORIDE, SODIUM LACTATE, POTASSIUM CHLORIDE, CALCIUM CHLORIDE: 600; 310; 30; 20 INJECTION, SOLUTION INTRAVENOUS at 07:23

## 2020-06-09 RX ADMIN — PROPOFOL 200 MG: 10 INJECTION, EMULSION INTRAVENOUS at 08:05

## 2020-06-09 RX ADMIN — SODIUM CHLORIDE, SODIUM LACTATE, POTASSIUM CHLORIDE, CALCIUM CHLORIDE: 600; 310; 30; 20 INJECTION, SOLUTION INTRAVENOUS at 11:59

## 2020-06-09 ASSESSMENT — MIFFLIN-ST. JEOR: SCORE: 1776.75

## 2020-06-09 NOTE — PROVIDER NOTIFICATION
Pt fresh to pacu, unable to move his arms, states they are very sore.  When passive range of motion is attempted, pain increased.  Dr Watt notified, states will assess.

## 2020-06-09 NOTE — ANESTHESIA POSTPROCEDURE EVALUATION
Anesthesia POST Procedure Evaluation    Patient: Jamel Bacon   MRN:     [id number] Gender:   male   Age:    40 year old :      1980        Preoperative Diagnosis: S/P vasectomy [Z98.52]   Procedure(s):  right VASOVASOSTOMY and left vasoepididymostomy   Postop Comments: No value filed.     Anesthesia Type: General       Disposition: Outpatient   Postop Pain Control: Uneventful            Sign Out: Well controlled pain   PONV: No   Neuro/Psych: Uneventful            Sign Out: Acceptable/Baseline neuro status   Airway/Respiratory: Uneventful            Sign Out: Acceptable/Baseline resp. status   CV/Hemodynamics: Uneventful            Sign Out: Acceptable CV status   Other NRE: NONE   DID A NON-ROUTINE EVENT OCCUR? No    Event details/Postop Comments:  Woke up with bilateral forearm pain. On my evaluation 10 minutes later, this pain was improving, described by the patient as a muscle soreness like from having worked out excessively. On exam, he has no strength deficits in his upper extremities. I discussed with the patient my expectation that this pain will improve over the coming minutes to hours but that he should discuss this on his nursing follow up call if still present.         Last Anesthesia Record Vitals:  CRNA VITALS  2020 1304 - 2020 1404      2020             Pulse:  72    SpO2:  95 %    Resp Rate (observed):  8          Last PACU Vitals:  Vitals Value Taken Time   /83 2020  1:50 PM   Temp 36.9  C (98.4  F) 2020  1:50 PM   Pulse 69 2020  1:50 PM   Resp 32 2020  1:53 PM   SpO2 93 % 2020  1:53 PM   Temp src     NIBP     Pulse     SpO2     Resp     Temp     Ht Rate     Temp 2     Vitals shown include unvalidated device data.      Electronically Signed By: Nicho Watt MD, 2020, 2:47 PM

## 2020-06-09 NOTE — BRIEF OP NOTE
[facility]    Brief Operative Note     Pre-operative diagnosis: S/P vasectomy [Z98.52]  Post-operative diagnosis Same as pre-operative diagnosis    Procedure: Procedure(s):  right VASOVASOSTOMY and left vasoepididymostomy  Surgeon: Surgeon(s) and Role:     * Davi Mendez MD - Primary     * Marv Beck MD - Resident - Assisting  Anesthesia: General   Estimated blood loss: Less than 10 ml  Drains:  None  Specimens: * No specimens in log *  Findings:   Left vasoepi, right vasovaso.  Complications: None.  Implants: * No implants in log *

## 2020-06-09 NOTE — DISCHARGE INSTRUCTIONS
[facility]  Home Care Following Anesthesia  For 24 hours after surgery:  1. Get plenty of rest.  A responsible adult must stay with you for at least 24 hours after you leave the surgery center.  2. Do not drive or use heavy equipment.  If you have weakness or tingling, don't drive or use heavy equipment until this feeling goes away.   3. Do not drink alcohol.   4. Avoid strenuous or risky activities.  Ask for help when climbing stairs.  5. You may feel lightheaded.  IF so, sit for a few minutes before standing.  Have someone help you get up.   6. If you have nausea (feel sick to your stomach): Drink only clear liquids such as apple juice, ginger ale, broth or 7-Up.  Rest may also help.  Be sure to drink enough fluids.  Move to a regular diet as you feel able.   7. You may have a slight fever.  Call the doctor if your fever is over 100 F (37.7 C) (taken under the tongue) or lasts longer than 24 hours.  8. You may have a dry mouth, a sore throat, muscle aches or trouble sleeping. These should go away after 24 hours.  9. Do not make important or legal decisions.     Tips for taking pain medications  To get the best pain relief possible, remember these points:    Take pain medications as directed, before pain becomes severe.    Pain medication can upset your stomach: taking it with food may help.    Constipation is a common side effect of pain medication. Drink plenty of  fluids.    Eat foods high in fiber. Take a stool softener if recommended by your doctor or pharmacist.    Do not drink alcohol, drive or operate machinery while taking pain medications.    Ask about other ways to control pain, such as with heat, ice or relaxation.    Tylenol/Acetaminophen Consumption  To help encourage the safe use of acetaminophen, the makers of TYLENOL  have lowered the maximum daily dose for single-ingredient Extra Strength TYLENOL  (acetaminophen) products sold in the [place] from 8 pills per day  (4,000 mg) to 6 pills per day (3,000 mg). The dosing interval has also changed from 2 pills every 4-6 hours to 2 pills every 6 hours.    If you feel your pain relief is insufficient, you may take Tylenol/Acetaminophen in addition to your narcotic pain medication.     Be careful not to exceed 3,000 mg of Tylenol/Acetaminophen in a 24 hour period from all sources.    If you are taking extra strength Tylenol/acetaminophen (500 mg), the maximum dose is 6 tablets in 24 hours.    If you are taking regular strength acetaminophen (325 mg), the maximum dose is 9 tablets in 24 hours.  **975 mg Tylenol given at 7:15, can take again at 1:15 PM, if needed.  Follow package instructions.    Call a doctor for any of the followin. Signs of infection (fever, growing tenderness at the surgery site, a large amount of drainage or bleeding, severe pain, foul-smelling drainage, redness, swelling).  2. It has been over 8 to 10 hours since surgery and you are still not able to urinate (pass water).  3. Headache for over 24 hours.  4. Signs of Covid-19 infection (temperature over 100 degrees, shortness of breath, cough, loss of taste/smell, generalized body aches, persistent headache, chills, sore throat, nausea/vomiting/diarrhea)  Your doctor is:  Dr. Davi Mendez, Prostate and Urology: [phone]                  Or dial [phone] and ask for the resident on call for:  Prostate Urology  For emergency care, call the:  [facility]:  [phone] (TTY for hearing impaired: [phone])

## 2020-06-09 NOTE — ANESTHESIA CARE TRANSFER NOTE
Patient: Jamel Bacon    Procedure(s):  right VASOVASOSTOMY and left vasoepididymostomy    Diagnosis: S/P vasectomy [Z98.52]  Diagnosis Additional Information: No value filed.    Anesthesia Type:   General     Note:    Patient transferred to:PACU  Comments: VSS/WNL. Responds slowly to name.Handoff Report: Identifed the Patient, Identified the Reponsible Provider, Reviewed the pertinent medical history, Discussed the surgical course, Reviewed Intra-OP anesthesia mangement and issues during anesthesia, Set expectations for post-procedure period and Allowed opportunity for questions and acknowledgement of understanding      Vitals: (Last set prior to Anesthesia Care Transfer)    CRNA VITALS  6/9/2020 1304 - 6/9/2020 1338      6/9/2020             Pulse:  72    SpO2:  95 %    Resp Rate (observed):  8                Electronically Signed By: RHIANNON Beaver CRNA  June 9, 2020  1:38 PM

## 2020-06-09 NOTE — OP NOTE
DATE OF SURGERY:    June 9, 2020    PREOPERATIVE DIAGNOSIS:   Azoospermia.   POSTOPERATIVE DIAGNOSIS:  Epididymal obstruction due to previous vasectomy and subsequent vasovasostomy with scarred anastomoses.  SURGEON: Davi Mendez MD  RESIDENT: Marv Beck MD  ANESTHESIA: General endotracheal .   SURGICAL PROCEDURE: left vasoepididymostomy, right vasovasostomy.  ESTIMATED BLOOD LOSS: 2 mL.   COMPLICATIONS: None.   SPECIMENS: None.     INDICATIONS: Mr. Jamel Bacon is a 40 year old MALE with azoospermia following previous bilateral vasovasostomy. He preferred to attempt reconstruction again following discussion in clinic. The patient was counseled on the alternatives, risks, and benefits and elected to proceed with the above stated procedure.    OPERATIVE PROCEDURE: After informed consent was obtained, the patient was taken to the operating room and placed supine. Pneumoboots were applied, preoperative antibiotics were administered IV, and the genitals were prepped and draped in the supine position. A timeout was performed with the operating room personnel.     We began the procedure on the right side. 2 cc of a 1:1 mixture of 1% lidocaine and 0.5% Marcaine was injected over the superficial skin over an approximately 4 cm area. A 15 blade scalpel was used to incise the skin, and the dartos was opened with cautery.  A similar incision was made on the left scrotum.      On the right side, with careful attention to the vas deferens, we were able to identify the site of the previous vasectomy/vasovasostomy with surrounding scarring. This area was isolated and carefully dissected free from the inferior vasculature.  The testicular end was in the distal (from testicle) convoluted vas. Each end was isolated and approximated off-tension with a 6-0 Prolene. Approximately 2.0 cm of vas was freed up on each side. The vas was amputated above and below the scar tissue with a 15 degree vas cutting guide. After accomplishing  this, clear serous fluid was seen extravasating from the cut end which was encouraging.  We also instilled 2 cc normal saline into the abdominal portion of the vas to confirm that this was patent, which it was. We then began the reanastomosis which we performed with a standard 2-layer closure with 9-0 interrupted nylons on the outer layer and 10-0 Nylon interrupted x 6 on the inner layer was performed  Closure was water-tight at the end. There was no tension on the anastomosis and the vas was delivered back into the right hemiscrotum.   We then turned our attention to the left side.     We then turned our attention to the left side. We again were able to identify the site of the previous vasectomy/vasovasostomy with surrounding scarring. This area was isolated and carefully dissected free from the inferior vasculature.  The testicular end was in the distal (from testicle) convoluted vas. Each end was isolated and approximated off-tension with a 6-0 Prolene. Approximately 2.0 cm of vas was freed up on each side. The vas was amputated above and below the scar tissue with a 15 degree vas cutting guide. However we could not express any fluid from the distal vas, so decision was made to proceed with vasoepididymostomy given the dry left vas..     The left testicle delivered from the incision, the tunica vaginalis was opened and the epididymis and vas deferens were examined. The left epididymis was palpably within normal limits but appeared to have a transition point at the proximal 1/3 of the body.  We began the vasoepididymostomy by approximating the vas deference with the head of the epididymis.  The distal vas deferens was approximated to the epididymal tunic with interrupted suture of 6-0 Prolene. An opening was created in the epididymal tunic, and the posterior wall of the vas deferens was reapproximated to the epididymal tunic with interrupted 9-0 nylon sutures. Epididymal tubules were identified and unroofed, and  "aspiration of this fluid did show \"intact nonmotile sperm\".  Triangulated sutures of 10-0 Nylon x 3 were placed.     A vasoepididymostomy was then completed. The inner lumen of the vas deferens was approximated to the epididymal tubules with triangulated sutures of 10-0 nylon, which were brought out the odd-numbered clock positions on the mucosa of the vas deferens. The connection appeared watertight. Additional interrupted 9-0 sutures were used to approximate the remainder of the muscle layer of the vas deferens to the epididymal tunic. The testicle was irrigated copiously with saline. The left tunica vaginalis was closed with a running 3-0 chromic and the testicle was returned into the left hemiscrotum.     POSTOP PLAN: Semen analysis in 3 months.   Light activity x 4 weeks, no heavy lifting, no ejaculating for a month.    I was present and scrubbed for the entire procedure.  Davi Mendez MD  Urology Staff   Operative time 4.5 hours  "

## 2020-06-12 ENCOUNTER — MYC MEDICAL ADVICE (OUTPATIENT)
Dept: UROLOGY | Facility: CLINIC | Age: 40
End: 2020-06-12

## 2020-06-12 ENCOUNTER — PATIENT OUTREACH (OUTPATIENT)
Dept: UROLOGY | Facility: CLINIC | Age: 40
End: 2020-06-12

## 2020-06-12 NOTE — TELEPHONE ENCOUNTER
Sent message via my-chart to see how the patient is doing since surgery with Dr Mendez for vasectomy reversal on 6/9/2020.    Tiffanie Collins RN   Care Coordinator Urology

## 2020-09-02 DIAGNOSIS — Z31.0 H/O VASOVASOSTOMY: ICD-10-CM

## 2020-09-02 DIAGNOSIS — Z98.52 S/P VASECTOMY: Primary | ICD-10-CM

## 2020-09-10 DIAGNOSIS — Z31.0 H/O VASOVASOSTOMY: ICD-10-CM

## 2020-09-10 PROCEDURE — 89322 SEMEN ANAL STRICT CRITERIA: CPT

## 2020-09-11 LAB
ABNORMAL SPERM: 95 MORPHOLOGY
ABSTINENCE DAYS: 6 DAYS (ref 2–7)
AGGLUTINATION: NO YES/NO
ANALYSIS TEMP - CENTIGRADE: 23 CENTIGRADE
CELL FRAGMENTS: NORMAL %
COLLECTION METHOD: NORMAL
COLLECTION SITE: NORMAL
CONSENT TO RELEASE TO PARTNER: YES
HEAD DEFECT: 95
IMMATURE SPERM: NORMAL %
IMMOTILE: 52 %
LAB RECEIPT TIME: NORMAL
LIQUEFIED: YES YES/NO
MIDPIECE DEFECT: 62
NON-PROGRESSIVE MOTILITY: 3 %
NORMAL SPERM: 5 % NORMAL FORMS (ref 4–?)
PROGRESSIVE MOTILITY: 45 % (ref 32–?)
ROUND CELLS: 0.6 MILLION/ML (ref ?–2)
SPECIMEN CONCENTRATION: 31 MILLION/ML (ref 15–?)
SPECIMEN PH: 7.2 PH (ref 7.2–?)
SPECIMEN TYPE: NORMAL
SPECIMEN VOL UR: 2.7 ML (ref 1.5–?)
TAIL DEFECT: 31
TIME OF ANALYSIS: NORMAL
TOTAL NUMBER: 84 MILLION (ref 39–?)
TOTAL PROGRESSIVE MOTILE: 38 MILLION (ref 15.6–?)
VISCOUS: NO YES/NO
VITALITY: NORMAL % (ref 58–?)
WBC SPECIMEN: NORMAL %

## 2020-09-13 NOTE — RESULT ENCOUNTER NOTE
Dear Jamel,     Here are your recent results.     Semen analysis is perfect post-op, normal on every measure.    If not pregnant in 6-8 months give my office a call and we can repeat another semen analysis.  If you wanted to freeze sperm or do IUI (intrauterine inseminations) with a female fertility specialist, we can help arrange that.    Congrats these are very good numbers.    Please let us know if you have any questions or concerns.     Raul VALDES

## 2020-09-28 ENCOUNTER — PRE VISIT (OUTPATIENT)
Dept: UROLOGY | Facility: CLINIC | Age: 40
End: 2020-09-28

## 2020-09-28 NOTE — TELEPHONE ENCOUNTER
Reason for Visit: post operative check up and SA review S/P epididymal obstruction due to previous vasectomy and subsequent vasovasostomy with scarred anastomoses    Orders/Procedures/Records: in system    Contact Patient: n/a    Rooming Requirements: normal      Roselyn Roach LPN  09/28/20  9:51 AM

## 2020-09-30 ENCOUNTER — VIRTUAL VISIT (OUTPATIENT)
Dept: UROLOGY | Facility: CLINIC | Age: 40
End: 2020-09-30
Payer: COMMERCIAL

## 2020-09-30 DIAGNOSIS — Z31.0 H/O VASOVASOSTOMY: Primary | ICD-10-CM

## 2020-09-30 NOTE — LETTER
"9/30/2020       RE: Jamel Bacon  [address] [facility] [address]     Dear Colleague,    Thank you for referring your patient, Jamel Bacon, to the [facility] UROLOGY AND INST FOR PROSTATE AND UROLOGIC CANCERS at [facility]. Please see a copy of my visit note below.    Video Visit Technology for this patient: [employer] Video Visit- Patient was left in waiting room    Jamel Bacon is a 40 year old male who is being evaluated via a billable video visit.      The patient has been notified of following:     \"This video visit will be conducted via a call between you and your physician/provider. We have found that certain health care needs can be provided without the need for an in-person physical exam.  This service lets us provide the care you need with a video conversation.  If a prescription is necessary we can send it directly to your pharmacy.  If lab work is needed we can place an order for that and you can then stop by our lab to have the test done at a later time.    Video visits are billed at different rates depending on your insurance coverage.  Please reach out to your insurance provider with any questions.    If during the course of the call the physician/provider feels a video visit is not appropriate, you will not be charged for this service.\"    Patient has given verbal consent for Video visit? Yes  How would you like to obtain your AVS? MyChart  If you are dropped from the video visit, the video invite should be resent to: Text to cell phone: [phone]  Will anyone else be joining your video visit? No        Video-Visit Details    Type of service:  Video Visit    Video Start Time: 0838  Video End Time: 0842    Originating Location (pt. Location): Home    Distant Location (provider location):  [facility] UROLOGY AND [facility] FOR PROSTATE AND UROLOGIC CANCERS     Platform used for Video Visit: Conferensum                Video visit   Ingrid  Pt at home  Video call " "[phone]    CC: Jamel Bacon is post-op from left vasoepididymostomy, right vasovasostomy ( both redo after previous failed bilateral vasovasostomy) done 6/9/20.    HPI: Patient is just over 3mos post-op.  he has been doing well. No fevers or chills. Pain resolved.  He did have one day a month ago when he had a painful testis in the shower- he thinks he just grabbed himself wrong- 10/10 pain for \"1 second\" that made him have to sit down in the shower.  Other than that no pain.    His wife is 7 weeks pregnant already.  Semen analysis 9/10/20 shows semen analysis normal on all parameters with total progressive motile count 38M.    Component      Latest Ref Rng & Units 9/10/2020   Collection Method       Masturbation   Collection Site       JAMES   Specimen Type       Semen   Lab Receipt Time       07:55 AM   Time of Analysis       08:35 AM   Analysis Temp - Centigrade      centigrade 23   Abstinence days      2 - 7 days 6   Liquefied      yes/no Yes   Viscous      yes/no No   Agglutination      yes/no No   pH      7.2 pH 7.2   Volume      1.5 ml 2.7   Concentration      15 million/ml 31   Total Number      39 million 84   Progressive motility      32 % 45   Non-progressive motility      % 3   Immotile      % 52   Total Progressive Motile      15.6 million 38   Vitality      58 % ND   Normal Sperm      4 % normal forms 5   Abnormal Sperm      morphology 95   Head Defect       95   Midpiece Defect       62   Tail Defect       31   Round Cells      2 million/ml 0.6   WBC      % .   Immature Sperm      % .   Cell Fragments      % .   Consent to Release to Partner       Yes       Exam  General- Alert, oriented, nad.  Pleasant and conversant.  Eyes- anicteric, EOMI.  Resps- normal, non-labored.  No cough  Abdomen-  nondistended.   exam- deferred.   Neurological - no tremors  Skin - no discoloration/ lesions noted  Psychiatric - no anxiety, alert & oriented.       The rest of a comprehensive physical examination is " deferred due to PHE (public health emergency) video visit restrictions.      Assessment:  Normal semen analysis and already a pregnancy after redo reversal with left vasoepididymostomy and right vasovasostomy.    PLAN:     F/U PRN.             Again, thank you for allowing me to participate in the care of your patient.      Sincerely,    Davi Mendez MD

## 2020-09-30 NOTE — NURSING NOTE
Chief Complaint   Patient presents with     Surgical Followup     post operative check up and SA review S/P epididymal obstruction due to previous vasectomy and subsequent vasovasostomy with scarred anastomoses       Patient Active Problem List   Diagnosis     S/P vasectomy       No Known Allergies    Current Outpatient Medications   Medication Sig Dispense Refill     mirtazapine (REMERON) 15 MG tablet Take 1 tablet by mouth nightly as needed       acetaminophen (TYLENOL) 325 MG tablet Take 2 tablets (650 mg) by mouth every 4 hours as needed for mild pain 50 tablet 0     oxyCODONE (ROXICODONE) 5 MG tablet Take 1 tablet (5 mg) by mouth every 6 hours as needed for breakthrough pain or severe pain 20 tablet 0     senna-docusate (SENOKOT-S/PERICOLACE) 8.6-50 MG tablet Take 1-2 tablets by mouth 2 times daily 30 tablet 0       Social History     Tobacco Use     Smoking status: Former Smoker     Last attempt to quit: 2010     Years since quitting: 10.7     Smokeless tobacco: Never Used   Substance Use Topics     Alcohol use: None     Comment: rare     Drug use: Not Currently       Roselyn Roach LPN  9/30/2020  8:33 AM

## 2020-09-30 NOTE — PROGRESS NOTES
"  Video visit   Ingrid  Pt at home  Video call [phone]    CC: Jamel Bacon is post-op from left vasoepididymostomy, right vasovasostomy ( both redo after previous failed bilateral vasovasostomy) done 6/9/20.    HPI: Patient is just over 3mos post-op.  he has been doing well. No fevers or chills. Pain resolved.  He did have one day a month ago when he had a painful testis in the shower- he thinks he just grabbed himself wrong- 10/10 pain for \"1 second\" that made him have to sit down in the shower.  Other than that no pain.    His wife is 7 weeks pregnant already.  Semen analysis 9/10/20 shows semen analysis normal on all parameters with total progressive motile count 38M.    Component      Latest Ref Rng & Units 9/10/2020   Collection Method       Masturbation   Collection Site       JAMES   Specimen Type       Semen   Lab Receipt Time       07:55 AM   Time of Analysis       08:35 AM   Analysis Temp - Centigrade      centigrade 23   Abstinence days      2 - 7 days 6   Liquefied      yes/no Yes   Viscous      yes/no No   Agglutination      yes/no No   pH      7.2 pH 7.2   Volume      1.5 ml 2.7   Concentration      15 million/ml 31   Total Number      39 million 84   Progressive motility      32 % 45   Non-progressive motility      % 3   Immotile      % 52   Total Progressive Motile      15.6 million 38   Vitality      58 % ND   Normal Sperm      4 % normal forms 5   Abnormal Sperm      morphology 95   Head Defect       95   Midpiece Defect       62   Tail Defect       31   Round Cells      2 million/ml 0.6   WBC      % .   Immature Sperm      % .   Cell Fragments      % .   Consent to Release to Partner       Yes       Exam  General- Alert, oriented, nad.  Pleasant and conversant.  Eyes- anicteric, EOMI.  Resps- normal, non-labored.  No cough  Abdomen-  nondistended.   exam- deferred.   Neurological - no tremors  Skin - no discoloration/ lesions noted  Psychiatric - no anxiety, alert & oriented.       The rest of " a comprehensive physical examination is deferred due to PHE (public health emergency) video visit restrictions.      Assessment:  Normal semen analysis and already a pregnancy after redo reversal with left vasoepididymostomy and right vasovasostomy.    PLAN:     F/U PRN.

## 2020-09-30 NOTE — PROGRESS NOTES
"Video Visit Technology for this patient: [employer] Video Visit- Patient was left in waiting room    Jamel Bacon is a 40 year old male who is being evaluated via a billable video visit.      The patient has been notified of following:     \"This video visit will be conducted via a call between you and your physician/provider. We have found that certain health care needs can be provided without the need for an in-person physical exam.  This service lets us provide the care you need with a video conversation.  If a prescription is necessary we can send it directly to your pharmacy.  If lab work is needed we can place an order for that and you can then stop by our lab to have the test done at a later time.    Video visits are billed at different rates depending on your insurance coverage.  Please reach out to your insurance provider with any questions.    If during the course of the call the physician/provider feels a video visit is not appropriate, you will not be charged for this service.\"    Patient has given verbal consent for Video visit? Yes  How would you like to obtain your AVS? MyChart  If you are dropped from the video visit, the video invite should be resent to: Text to cell phone: [phone]  Will anyone else be joining your video visit? No        Video-Visit Details    Type of service:  Video Visit    Video Start Time: 0838  Video End Time: 0842    Originating Location (pt. Location): Home    Distant Location (provider location):  [facility] UROLOGY AND [facility]     Platform used for Video Visit: Axel            "

## 2020-09-30 NOTE — LETTER
"9/30/2020      RE: Jamel Bacon  [address] [facility] [address]       Video Visit Technology for this patient: [employer] Video Visit- Patient was left in waiting room    Jamel Bacon is a 40 year old male who is being evaluated via a billable video visit.      The patient has been notified of following:     \"This video visit will be conducted via a call between you and your physician/provider. We have found that certain health care needs can be provided without the need for an in-person physical exam.  This service lets us provide the care you need with a video conversation.  If a prescription is necessary we can send it directly to your pharmacy.  If lab work is needed we can place an order for that and you can then stop by our lab to have the test done at a later time.    Video visits are billed at different rates depending on your insurance coverage.  Please reach out to your insurance provider with any questions.    If during the course of the call the physician/provider feels a video visit is not appropriate, you will not be charged for this service.\"    Patient has given verbal consent for Video visit? Yes  How would you like to obtain your AVS? MyChart  If you are dropped from the video visit, the video invite should be resent to: Text to cell phone: [phone]  Will anyone else be joining your video visit? No        Video-Visit Details    Type of service:  Video Visit    Video Start Time: 0838  Video End Time: 0842    Originating Location (pt. Location): Home    Distant Location (provider location):  [facility] UROLOG[facility] AND [facility]     Platform used for Video Visit: Well                Video visit   Ingrid  Pt at home  Video call [phone]    CC: Jamel Bacon is post-op from left vasoepididymostomy, right vasovasostomy ( both redo after previous failed bilateral vasovasostomy) done 6/9/20.    HPI: Patient is just over 3mos post-op.  he has been doing well. No " "fevers or chills. Pain resolved.  He did have one day a month ago when he had a painful testis in the shower- he thinks he just grabbed himself wrong- 10/10 pain for \"1 second\" that made him have to sit down in the shower.  Other than that no pain.    His wife is 7 weeks pregnant already.  Semen analysis 9/10/20 shows semen analysis normal on all parameters with total progressive motile count 38M.    Component      Latest Ref Rng & Units 9/10/2020   Collection Method       Masturbation   Collection Site       JAMES   Specimen Type       Semen   Lab Receipt Time       07:55 AM   Time of Analysis       08:35 AM   Analysis Temp - Centigrade      centigrade 23   Abstinence days      2 - 7 days 6   Liquefied      yes/no Yes   Viscous      yes/no No   Agglutination      yes/no No   pH      7.2 pH 7.2   Volume      1.5 ml 2.7   Concentration      15 million/ml 31   Total Number      39 million 84   Progressive motility      32 % 45   Non-progressive motility      % 3   Immotile      % 52   Total Progressive Motile      15.6 million 38   Vitality      58 % ND   Normal Sperm      4 % normal forms 5   Abnormal Sperm      morphology 95   Head Defect       95   Midpiece Defect       62   Tail Defect       31   Round Cells      2 million/ml 0.6   WBC      % .   Immature Sperm      % .   Cell Fragments      % .   Consent to Release to Partner       Yes       Exam  General- Alert, oriented, nad.  Pleasant and conversant.  Eyes- anicteric, EOMI.  Resps- normal, non-labored.  No cough  Abdomen-  nondistended.   exam- deferred.   Neurological - no tremors  Skin - no discoloration/ lesions noted  Psychiatric - no anxiety, alert & oriented.       The rest of a comprehensive physical examination is deferred due to PHE (public health emergency) video visit restrictions.      Assessment:  Normal semen analysis and already a pregnancy after redo reversal with left vasoepididymostomy and right vasovasostomy.    PLAN:     F/U PRN.       "       Davi Mendez MD

## 2021-01-03 ENCOUNTER — HEALTH MAINTENANCE LETTER (OUTPATIENT)
Age: 41
End: 2021-01-03

## 2021-04-25 ENCOUNTER — HEALTH MAINTENANCE LETTER (OUTPATIENT)
Age: 41
End: 2021-04-25

## 2021-10-10 ENCOUNTER — HEALTH MAINTENANCE LETTER (OUTPATIENT)
Age: 41
End: 2021-10-10

## 2022-05-21 ENCOUNTER — HEALTH MAINTENANCE LETTER (OUTPATIENT)
Age: 42
End: 2022-05-21

## 2022-09-18 ENCOUNTER — HEALTH MAINTENANCE LETTER (OUTPATIENT)
Age: 42
End: 2022-09-18

## 2023-06-04 ENCOUNTER — HEALTH MAINTENANCE LETTER (OUTPATIENT)
Age: 43
End: 2023-06-04

## (undated) DEVICE — GLOVE PROTEXIS W/NEU-THERA 7.5  2D73TE75

## (undated) DEVICE — LINEN TOWEL PACK X6 WHITE 5487

## (undated) DEVICE — DRAPE LAP TRANSVERSE 29421

## (undated) DEVICE — SYR 03ML LL W/O NDL 309657

## (undated) DEVICE — SU NYLON 10-0 DA 1" AA-1824

## (undated) DEVICE — WIPE INSTRUMENT MEROCEL

## (undated) DEVICE — DRSG KERLIX FLUFFS X5

## (undated) DEVICE — ESU ELEC NDL 1" COATED/INSULATED E1465

## (undated) DEVICE — STRAP KNEE/BODY 31143004

## (undated) DEVICE — GLOVE PROTEXIS W/NEU-THERA 8.5  2D73TE85

## (undated) DEVICE — NDL ANGIOCATH 24GA 0.75" 4053

## (undated) DEVICE — SU PROLENE 6-0 RB-2DA 30" 8711H

## (undated) DEVICE — LINEN TOWEL PACK X5 5464

## (undated) DEVICE — SUPPORTER ATHLETIC LG LATEX 202636

## (undated) DEVICE — BLADE CLIPPER SGL USE 9680

## (undated) DEVICE — CATH TRAY FOLEY SURESTEP 16FR W/DRAIN BAG LF A300416A

## (undated) DEVICE — ESU GROUND PAD ADULT W/CORD E7507

## (undated) DEVICE — NDL ANGIOCATH 14GA 1.25" 3068

## (undated) DEVICE — SOL WATER IRRIG 500ML BOTTLE 2F7113

## (undated) DEVICE — SUCTION MANIFOLD NEPTUNE 2 SYS 1 PORT 702-025-000

## (undated) DEVICE — PREP CHLORAPREP 26ML TINTED ORANGE  260815

## (undated) DEVICE — PACK MINOR CUSTOM ASC

## (undated) DEVICE — EYE SPONGE SPEAR WECK CEL 0008685

## (undated) DEVICE — BASIN SET MINOR DISP

## (undated) DEVICE — SOL NACL 0.9% IRRIG 1000ML BOTTLE 2F7124

## (undated) DEVICE — BLADE 35MM STR CBS-35

## (undated) DEVICE — DRAPE MICROSCOPE LEICA 54X150" AR8033650

## (undated) DEVICE — ESU CORD BIPOLAR GREEN 10-4000

## (undated) RX ORDER — PROPOFOL 10 MG/ML
INJECTION, EMULSION INTRAVENOUS
Status: DISPENSED
Start: 2020-06-09

## (undated) RX ORDER — CEFAZOLIN SODIUM 1 G/3ML
INJECTION, POWDER, FOR SOLUTION INTRAMUSCULAR; INTRAVENOUS
Status: DISPENSED
Start: 2020-06-09

## (undated) RX ORDER — LIDOCAINE HYDROCHLORIDE 20 MG/ML
INJECTION, SOLUTION EPIDURAL; INFILTRATION; INTRACAUDAL; PERINEURAL
Status: DISPENSED
Start: 2020-06-09

## (undated) RX ORDER — DEXAMETHASONE SODIUM PHOSPHATE 4 MG/ML
INJECTION, SOLUTION INTRA-ARTICULAR; INTRALESIONAL; INTRAMUSCULAR; INTRAVENOUS; SOFT TISSUE
Status: DISPENSED
Start: 2020-06-09

## (undated) RX ORDER — PAPAVERINE HYDROCHLORIDE 30 MG/ML
INJECTION INTRAMUSCULAR; INTRAVENOUS
Status: DISPENSED
Start: 2020-06-09

## (undated) RX ORDER — CEFAZOLIN SODIUM 2 G/50ML
SOLUTION INTRAVENOUS
Status: DISPENSED
Start: 2020-06-09

## (undated) RX ORDER — ONDANSETRON 2 MG/ML
INJECTION INTRAMUSCULAR; INTRAVENOUS
Status: DISPENSED
Start: 2020-06-09

## (undated) RX ORDER — HYDROMORPHONE HYDROCHLORIDE 1 MG/ML
INJECTION, SOLUTION INTRAMUSCULAR; INTRAVENOUS; SUBCUTANEOUS
Status: DISPENSED
Start: 2020-06-09

## (undated) RX ORDER — FENTANYL CITRATE 50 UG/ML
INJECTION, SOLUTION INTRAMUSCULAR; INTRAVENOUS
Status: DISPENSED
Start: 2020-06-09

## (undated) RX ORDER — ACETAMINOPHEN 325 MG/1
TABLET ORAL
Status: DISPENSED
Start: 2020-06-09

## (undated) RX ORDER — BUPIVACAINE HYDROCHLORIDE 5 MG/ML
INJECTION, SOLUTION EPIDURAL; INTRACAUDAL
Status: DISPENSED
Start: 2020-06-09

## (undated) RX ORDER — GABAPENTIN 300 MG/1
CAPSULE ORAL
Status: DISPENSED
Start: 2020-06-09